# Patient Record
Sex: MALE | Race: BLACK OR AFRICAN AMERICAN | NOT HISPANIC OR LATINO | Employment: STUDENT | URBAN - METROPOLITAN AREA
[De-identification: names, ages, dates, MRNs, and addresses within clinical notes are randomized per-mention and may not be internally consistent; named-entity substitution may affect disease eponyms.]

---
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
## 2024-01-23 ENCOUNTER — OFFICE VISIT (OUTPATIENT)
Dept: PEDIATRICS | Facility: CLINIC | Age: 19
End: 2024-01-23
Payer: COMMERCIAL

## 2024-01-23 VITALS
HEART RATE: 84 BPM | SYSTOLIC BLOOD PRESSURE: 120 MMHG | OXYGEN SATURATION: 100 % | BODY MASS INDEX: 49.44 KG/M2 | DIASTOLIC BLOOD PRESSURE: 78 MMHG | RESPIRATION RATE: 18 BRPM | TEMPERATURE: 98 F | HEIGHT: 67 IN | WEIGHT: 315 LBS

## 2024-01-23 DIAGNOSIS — E88.819 INSULIN RESISTANCE: ICD-10-CM

## 2024-01-23 DIAGNOSIS — F41.9 ANXIETY: ICD-10-CM

## 2024-01-23 DIAGNOSIS — F90.2 ATTENTION DEFICIT HYPERACTIVITY DISORDER (ADHD), COMBINED TYPE: ICD-10-CM

## 2024-01-23 DIAGNOSIS — E66.01 MORBID OBESITY WITH BMI OF 50.0-59.9, ADULT: Primary | ICD-10-CM

## 2024-01-23 DIAGNOSIS — G47.19 EXCESSIVE DAYTIME SLEEPINESS: ICD-10-CM

## 2024-01-23 DIAGNOSIS — G47.13 KLEINE-LEVIN SYNDROME: ICD-10-CM

## 2024-01-23 DIAGNOSIS — R79.89 LOW VITAMIN D LEVEL: ICD-10-CM

## 2024-01-23 DIAGNOSIS — F90.0 ATTENTION DEFICIT HYPERACTIVITY DISORDER (ADHD), PREDOMINANTLY INATTENTIVE TYPE: ICD-10-CM

## 2024-01-23 DIAGNOSIS — L20.89 OTHER ATOPIC DERMATITIS: ICD-10-CM

## 2024-01-23 DIAGNOSIS — G43.009 MIGRAINE WITHOUT AURA AND WITHOUT STATUS MIGRAINOSUS, NOT INTRACTABLE: ICD-10-CM

## 2024-01-23 DIAGNOSIS — J30.9 ALLERGIC RHINITIS, UNSPECIFIED SEASONALITY, UNSPECIFIED TRIGGER: ICD-10-CM

## 2024-01-23 DIAGNOSIS — F33.0 MILD EPISODE OF RECURRENT MAJOR DEPRESSIVE DISORDER: ICD-10-CM

## 2024-01-23 DIAGNOSIS — I10 PRIMARY HYPERTENSION: ICD-10-CM

## 2024-01-23 DIAGNOSIS — G47.33 OBSTRUCTIVE SLEEP APNEA SYNDROME: ICD-10-CM

## 2024-01-23 DIAGNOSIS — H61.23 BILATERAL IMPACTED CERUMEN: ICD-10-CM

## 2024-01-23 DIAGNOSIS — G47.30 SLEEP-DISORDERED BREATHING: ICD-10-CM

## 2024-01-23 DIAGNOSIS — G47.419 NARCOLEPSY WITHOUT CATAPLEXY: ICD-10-CM

## 2024-01-23 DIAGNOSIS — J30.89 NON-SEASONAL ALLERGIC RHINITIS, UNSPECIFIED TRIGGER: ICD-10-CM

## 2024-01-23 DIAGNOSIS — Z62.820 PARENT-CHILD CONFLICT: ICD-10-CM

## 2024-01-23 DIAGNOSIS — F32.A DEPRESSION, UNSPECIFIED DEPRESSION TYPE: ICD-10-CM

## 2024-01-23 PROCEDURE — 99215 OFFICE O/P EST HI 40 MIN: CPT | Mod: PBBFAC,PN | Performed by: PEDIATRICS

## 2024-01-23 RX ORDER — SERDEXMETHYLPHENIDATE AND DEXMETHYLPHENIDATE 10.4; 52.3 MG/1; MG/1
1 CAPSULE ORAL DAILY
Qty: 30 CAPSULE | Refills: 0 | Status: SHIPPED | OUTPATIENT
Start: 2024-03-23

## 2024-01-23 RX ORDER — CLONIDINE HYDROCHLORIDE 0.1 MG/1
0.1 TABLET ORAL NIGHTLY
Qty: 30 TABLET | Refills: 5 | Status: SHIPPED | OUTPATIENT
Start: 2024-01-23 | End: 2024-05-20

## 2024-01-23 RX ORDER — FLUTICASONE PROPIONATE 50 MCG
2 SPRAY, SUSPENSION (ML) NASAL DAILY
Qty: 16 G | Refills: 2 | Status: SHIPPED | OUTPATIENT
Start: 2024-01-23 | End: 2024-05-20 | Stop reason: SDUPTHER

## 2024-01-23 RX ORDER — SERDEXMETHYLPHENIDATE AND DEXMETHYLPHENIDATE 7.8; 39.2 MG/1; MG/1
1 CAPSULE ORAL DAILY
Qty: 30 CAPSULE | Refills: 0 | Status: SHIPPED | OUTPATIENT
Start: 2024-01-23 | End: 2024-04-12 | Stop reason: SDUPTHER

## 2024-01-23 RX ORDER — SERDEXMETHYLPHENIDATE AND DEXMETHYLPHENIDATE 10.4; 52.3 MG/1; MG/1
1 CAPSULE ORAL DAILY
Qty: 30 CAPSULE | Refills: 0 | Status: SHIPPED | OUTPATIENT
Start: 2024-02-23 | End: 2024-04-12 | Stop reason: SDUPTHER

## 2024-01-23 RX ORDER — FLUOXETINE HYDROCHLORIDE 20 MG/1
40 CAPSULE ORAL DAILY
Qty: 60 CAPSULE | Refills: 5 | Status: SHIPPED | OUTPATIENT
Start: 2024-01-23 | End: 2024-05-20

## 2024-01-23 RX ORDER — CETIRIZINE HYDROCHLORIDE 10 MG/1
10 TABLET ORAL DAILY
Qty: 30 TABLET | Refills: 5 | Status: SHIPPED | OUTPATIENT
Start: 2024-01-23

## 2024-01-23 NOTE — PROGRESS NOTES
SUBJECTIVE:  Jeet Medina is a 18 y.o. male here accompanied by mother for Follow-up (Pt present with mother for for 3 month follow up visit for ADHD, depression, and obesity. Mom states pt is not taking his nightly medicines. No concerns today. UTD with vaccines. )      HPI      Spoke with Jeet is  for follow up of ADHD, anxiety, trichotillomania,depression, parent child conflict, morbid obesity, AD and sleep problems.    At the last visit adherence to fluoxetine was encouraged( now improved)  along with improved physical activity( no change).      Attempt to prescribe Ozempic was denied by insurance and liraglutide is being explored and will submit PA. .     Concerta was changed to Azstarys due to shortages of Concerta.  Jeet is uncertain about efficacy.      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  Is considering college at [employer] in [employer] but has not started any aid applications.     Family working on his  father's veterans benefits for college assistance. Headaches occurring 2-3 days per week, mostly mild to moderate, severe HA weekly.     Often relieved by sleep.  Claims taking topiramate daily.      Depression:  I am down in the dumps---not currently taking full dose of fluoxetine or event taking daily dose.   ADHD : Concerta not available and not taking, taking Azstarys  Obesity:  was not filled ( uncertain as to why).  Resistant to low impact exercise such as walking.   .     HTN currently managed per Dr. Lynn. Next appointment scheduled for 2022.     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     His/ her current grade level is: He is due to graduate from [employer] soon.   Plans to attend [employer] to study vocals, music and computer science. Is prepping for the ACT.  Due to have knee surgery     Are there accommodations in place such 504 plan, resource, tutoring etc?  "No    Mood: often angry or crying    Appetite: good appetite, weight stable    Physical activity: none     Sleep pattern:now with increased sleeping/ fatigue and some day time somnolence. Does snore.  Had PSG last fall without significant apneas or hypopneas.  Diagnosed with Kleine-Carter Syndrome ( very rare condition characterized by persistent episodic hypersomnia and cognitive mood changes.  Some patients have associated hyperphagia and hypersexuality. Neurology has recommended weaning stimulants and fluoxetine and repeating PSG.      Mood: seems more stable at present PHQ-9  score 10  Denies significant sadness or anxiety    Anxiety/ OCD: occasionally anxious at home  Scared :  12     Hallucinations: none    Tics: hair pulling is in remission      Review of Systems   Constitutional:  Negative for activity change, appetite change, fatigue and fever.        Mother reports disrupted sleep pattern and extensive time each 24 hours sleeping.    HENT:  Negative for congestion, hearing loss, rhinorrhea and sneezing.    Eyes:  Negative for discharge.   Respiratory:  Negative for cough, shortness of breath and wheezing.    Cardiovascular:  Negative for chest pain.   Gastrointestinal:  Negative for abdominal pain, constipation, diarrhea, nausea and vomiting.   Musculoskeletal:  Negative for joint swelling.        Has torn right  ACL   Neurological:  Negative for dizziness, seizures, weakness, numbness and headaches.   Psychiatric/Behavioral:  Positive for sleep disturbance. Negative for agitation, decreased concentration and hallucinations. Self-injury: improved with clonidine.The patient is nervous/anxious (improved). The patient is not hyperactive.       A comprehensive review of symptoms was completed and negative except as noted above.    OBJECTIVE:  Vital signs  Vitals:    01/23/24 1547   BP: 120/78   Pulse: 84   Resp: 18   Temp: 97.9 °F (36.6 °C)   SpO2: 100%   Weight: (!) 150 kg (330 lb 11 oz)   Height: 5' 7.32" " (1.71 m)         No exam today, telemed       Physical Exam  Vitals and nursing note reviewed.   Constitutional:       General: He is not in acute distress.     Appearance: He is obese.   HENT:      Head: Normocephalic.      Right Ear: There is impacted cerumen.      Left Ear: There is impacted cerumen.      Nose: Congestion: mild, turbinates pale and swollen.      Mouth/Throat:      Mouth: Mucous membranes are moist.      Pharynx: Oropharynx is clear. No posterior oropharyngeal erythema.   Eyes:      Conjunctiva/sclera: Conjunctivae normal.      Pupils: Pupils are equal, round, and reactive to light.   Cardiovascular:      Rate and Rhythm: Normal rate.      Pulses: Normal pulses.      Heart sounds: Normal heart sounds. No murmur heard.  Pulmonary:      Effort: No respiratory distress.      Breath sounds: Normal breath sounds.   Abdominal:      General: There is no distension.      Palpations: There is no mass.      Tenderness: There is no abdominal tenderness. There is no guarding or rebound.      Hernia: No hernia is present.   Musculoskeletal:         General: Normal range of motion.      Cervical back: Normal range of motion.      Right knee: Normal.      Left knee: No swelling, deformity, effusion, erythema, ecchymosis or crepitus. Normal range of motion. No tenderness.      Comments: Left knee with full ROM and strength, no effusion and no knee tenderness, able to do full deep  knee bend without difficulty   Lymphadenopathy:      Cervical: No cervical adenopathy.   Skin:     General: Skin is warm and dry.      Findings: No rash.   Neurological:      General: No focal deficit present.      Mental Status: He is alert and oriented to person, place, and time.      Cranial Nerves: No cranial nerve deficit.      Sensory: No sensory deficit.      Motor: No weakness.      Coordination: Coordination normal.      Comments: Not somnolent today.  Discs sharp          ASSESSMENT/PLAN:  Jeet was seen today for  follow-up.    Jeet is difficult assess.  He is as the very least ambivalent about his response to meds and or need for them.   Some of this appears to be deliberately holding back on honest answers.       1. Morbid obesity with BMI of 50.0-59.9, adult    2. Primary hypertension    3. Anxiety  - FLUoxetine 20 MG capsule; Take 2 capsules (40 mg total) by mouth once daily.  Dispense: 60 capsule; Refill: 5    4. Sleep-disordered breathing    5. Attention deficit hyperactivity disorder (ADHD), combined type  - cloNIDine (CATAPRES) 0.1 MG tablet; Take 1 tablet (0.1 mg total) by mouth every evening.  Dispense: 30 tablet; Refill: 5  - serdexmethylphen-dexmethylphen (AZSTARYS) 39.2 mg- 7.8 mg Cap; Take 1 capsule by mouth once daily.  Dispense: 30 capsule; Refill: 0  - serdexmethylphen-dexmethylphen (AZSTARYS) 52.3 mg- 10.4 mg Cap; Take 1 capsule by mouth once daily.  Dispense: 30 capsule; Refill: 0  - serdexmethylphen-dexmethylphen (AZSTARYS) 52.3 mg- 10.4 mg Cap; Take 1 capsule by mouth once daily.  Dispense: 30 capsule; Refill: 0    6. Other atopic dermatitis    7. Insulin resistance    8. Mild episode of recurrent major depressive disorder    9. Obstructive sleep apnea syndrome    10. 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 Low vitamin D level    13. Migraine without aura and without status migrainosus, not intractable    14. Narcolepsy without cataplexy    15. Non-seasonal allergic rhinitis, unspecified trigger    16. Excessive daytime sleepiness    17. Allergic rhinitis, unspecified seasonality, unspecified trigger  - cetirizine (ZYRTEC) 10 MG tablet; Take 1 tablet (10 mg total) by mouth once daily. As needed for allergy symptoms  Dispense: 30 tablet; Refill: 5  - fluticasone propionate (FLONASE) 50 mcg/actuation nasal spray; 2 sprays (100 mcg total) by Each Nostril route once daily.  Dispense: 16 g; Refill: 2    18. Attention deficit hyperactivity disorder (ADHD), predominantly inattentive type  -  cloNIDine (CATAPRES) 0.1 MG tablet; Take 1 tablet (0.1 mg total) by mouth every evening.  Dispense: 30 tablet; Refill: 5  - serdexmethylphen-dexmethylphen (AZSTARYS) 52.3 mg- 10.4 mg Cap; Take 1 capsule by mouth once daily.  Dispense: 30 capsule; Refill: 0  - serdexmethylphen-dexmethylphen (AZSTARYS) 52.3 mg- 10.4 mg Cap; Take 1 capsule by mouth once daily.  Dispense: 30 capsule; Refill: 0    19. Depression, unspecified depression type  - FLUoxetine 20 MG capsule; Take 2 capsules (40 mg total) by mouth once daily.  Dispense: 60 capsule; Refill: 5    20. Bilateral impacted cerumen  - Ear wax removal      Follow up in about 3 months (around 4/23/2024) for follow up ADHD.

## 2024-01-24 ENCOUNTER — TELEPHONE (OUTPATIENT)
Dept: PEDIATRICS | Facility: CLINIC | Age: 19
End: 2024-01-24
Payer: COMMERCIAL

## 2024-03-13 ENCOUNTER — TELEPHONE (OUTPATIENT)
Dept: PEDIATRICS | Facility: CLINIC | Age: 19
End: 2024-03-13
Payer: COMMERCIAL

## 2024-03-13 NOTE — TELEPHONE ENCOUNTER
I spoke with the mom.  She said that Jeet still having frequent migraine headaches.  Has been on Topamax.  Mom goes to this Neurologist for her migraines and she said that she would see Jeet.

## 2024-03-20 DIAGNOSIS — G43.009 MIGRAINE WITHOUT AURA AND WITHOUT STATUS MIGRAINOSUS, NOT INTRACTABLE: Primary | ICD-10-CM

## 2024-04-01 DIAGNOSIS — I10 PRIMARY HYPERTENSION: Primary | ICD-10-CM

## 2024-04-08 ENCOUNTER — TELEPHONE (OUTPATIENT)
Dept: PEDIATRICS | Facility: CLINIC | Age: 19
End: 2024-04-08
Payer: COMMERCIAL

## 2024-04-09 NOTE — TELEPHONE ENCOUNTER
Referral faxed to Robin Palacios MD [phone].  Voice mail left on mother's phone as she did not answer the call.

## 2024-04-12 ENCOUNTER — OFFICE VISIT (OUTPATIENT)
Dept: PEDIATRIC CARDIOLOGY | Facility: CLINIC | Age: 19
End: 2024-04-12
Payer: COMMERCIAL

## 2024-04-12 VITALS
SYSTOLIC BLOOD PRESSURE: 111 MMHG | WEIGHT: 315 LBS | HEART RATE: 63 BPM | BODY MASS INDEX: 49.44 KG/M2 | OXYGEN SATURATION: 99 % | RESPIRATION RATE: 20 BRPM | HEIGHT: 67 IN | DIASTOLIC BLOOD PRESSURE: 59 MMHG

## 2024-04-12 DIAGNOSIS — E66.01 MORBID OBESITY WITH BMI OF 50.0-59.9, ADULT: Primary | ICD-10-CM

## 2024-04-12 DIAGNOSIS — I10 PRIMARY HYPERTENSION: ICD-10-CM

## 2024-04-12 LAB
OHS QRS DURATION: 100 MS
OHS QTC CALCULATION: 362 MS

## 2024-04-12 PROCEDURE — 1159F MED LIST DOCD IN RCRD: CPT | Mod: CPTII,S$GLB,, | Performed by: PEDIATRICS

## 2024-04-12 PROCEDURE — 3078F DIAST BP <80 MM HG: CPT | Mod: CPTII,S$GLB,, | Performed by: PEDIATRICS

## 2024-04-12 PROCEDURE — 3074F SYST BP LT 130 MM HG: CPT | Mod: CPTII,S$GLB,, | Performed by: PEDIATRICS

## 2024-04-12 PROCEDURE — 99214 OFFICE O/P EST MOD 30 MIN: CPT | Mod: 25,S$GLB,, | Performed by: PEDIATRICS

## 2024-04-12 PROCEDURE — 4010F ACE/ARB THERAPY RXD/TAKEN: CPT | Mod: CPTII,S$GLB,, | Performed by: PEDIATRICS

## 2024-04-12 PROCEDURE — 93000 ELECTROCARDIOGRAM COMPLETE: CPT | Mod: S$GLB,,, | Performed by: PEDIATRICS

## 2024-04-12 PROCEDURE — 3008F BODY MASS INDEX DOCD: CPT | Mod: CPTII,S$GLB,, | Performed by: PEDIATRICS

## 2024-04-12 PROCEDURE — 1160F RVW MEDS BY RX/DR IN RCRD: CPT | Mod: CPTII,S$GLB,, | Performed by: PEDIATRICS

## 2024-04-12 RX ORDER — AMLODIPINE BESYLATE 10 MG/1
10 TABLET ORAL DAILY
Qty: 90 TABLET | Refills: 1 | Status: SHIPPED | OUTPATIENT
Start: 2024-04-12 | End: 2024-10-09

## 2024-04-12 RX ORDER — LISINOPRIL 20 MG/1
20 TABLET ORAL DAILY
Qty: 90 TABLET | Refills: 1 | Status: SHIPPED | OUTPATIENT
Start: 2024-04-12 | End: 2024-10-09

## 2024-04-12 RX ORDER — METHOCARBAMOL 750 MG/1
750 TABLET, FILM COATED ORAL EVERY 8 HOURS
COMMUNITY
Start: 2024-03-13 | End: 2024-05-20

## 2024-04-12 NOTE — PROGRESS NOTES
[facility]  [phone]  4/12/2024     Jeet Medina  2005  [id number]     Jeet is here today with his mother.  He comes in for follow up of the following concerns: Hypertension and Obesity - Metabolic Syndrome.    I reached out to  to clarify what diagnosis he gave and what is only part of the differential. I spoke with , who noted that his sleep study did not show apnea and he was diagnosed with idiopathic hypersomnia.     Presents today with Mom.   Patient presents today for follow up visit.   Mom states that they have not heard from bariatric clinic regarding referral from Dr. Christiansen (10/2022) and Dr. Lynn (3/2023).  Patient completed sleep study on 9/20/22. Dr. 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    Since seeing me last, he tore his ACL and had to have surgery. Not currently able to do much in the way of physical activity.   Denies chest pain, shortness of breath, palpitations, dizziness, activity intolerance.   No cough since starting the Lisinopril.   States has been taking medication as prescribed. (Will occasionally miss the evening dose or one day of meds during the week)  Reports good appetite and hydration.   UTD on immunizations.   There are no reports of chest pain, chest pain with exertion, cyanosis, exercise intolerance, dyspnea, fatigue, palpitations, syncope and tachypnea.     Review of Systems:   Neuro:   Normal development. No seizures. + headaches.  Psych: No known ADD or ADHD.  No known learning disabilities.  RESP:  No recurrent pneumonias or asthma.  GI:  No history of reflux. No change in bowel habits.  :  No history of urinary tract infection or renal structural abnormalities.  MS:  No muscle or joint swelling or apparent tenderness.  SKIN:  No history of  rashes.  Heme/lymphatic: No history of anemia, excessive bruising or bleeding.  Allergic/Immunologic: No history of environmental allergies or immune compromise.  ENT: No hearing loss, no recurring ear infections.  Eyes: No visual disturbance or need for glasses.     Past Medical History:   Diagnosis Date    ADHD     Anxiety     Depression     History of COVID-19 04/2021    Hypertension     Left ventricular hypertrophy 8/4/2022    LVH (left ventricular hypertrophy)     Migraines     Sleep disorder     Syncope and collapse     Trichotillomania     Verruca vulgaris      Past Surgical History:   Procedure Laterality Date    ANTERIOR CRUCIATE LIGAMENT REPAIR Right 02/15/2024    TONSILLECTOMY AND ADENOIDECTOMY  2009     FAMILY HISTORY:   Family History   Problem Relation Age of Onset    Diabetes Mother     Hypertension Mother     Migraines Mother     Sleep apnea Mother     Diabetes Father     Hypertension Father     Hyperlipidemia Father     No Known Problems Sister     No Known Problems Brother     Hypertension Maternal Grandmother     Diabetes Maternal Grandmother     Hyperthyroidism Maternal Grandmother     Heart murmur Maternal Grandmother     Heart failure Maternal Grandmother     Hypertension Maternal Grandfather     Diabetes Maternal Grandfather     Hyperlipidemia Maternal Grandfather     Heart attack Maternal Grandfather     Hypertension Paternal Grandmother     Diabetes Paternal Grandmother     Cancer Paternal Grandmother     Hypertension Paternal Grandfather     Diabetes Paternal Grandfather     No Known Problems Brother        Social History     Socioeconomic History    Marital status: Single   Tobacco Use    Smoking status: Never     Passive exposure: Never    Smokeless tobacco: Never   Substance and Sexual Activity    Alcohol use: Never    Drug use: Never    Sexual activity: Never   Social History Narrative    Lives with Mom. Currently paused from going to school and work due to his knee rehab.          MEDICATIONS:   Current Outpatient Medications on File Prior to Visit   Medication Sig Dispense Refill    albuterol (PROVENTIL/VENTOLIN HFA) 90 mcg/actuation inhaler SMARTSI Puff(s) By Mouth 4 Times Daily PRN      cetirizine (ZYRTEC) 10 MG tablet Take 1 tablet (10 mg total) by mouth once daily. As needed for allergy symptoms 30 tablet 5    cloNIDine (CATAPRES) 0.1 MG tablet Take 1 tablet (0.1 mg total) by mouth every evening. 30 tablet 5    FLUoxetine 20 MG capsule Take 2 capsules (40 mg total) by mouth once daily. 60 capsule 5    fluticasone propionate (FLONASE) 50 mcg/actuation nasal spray 2 sprays (100 mcg total) by Each Nostril route once daily. 16 g 2    methocarbamoL (ROBAXIN) 750 MG Tab Take 750 mg by mouth every 8 (eight) hours.      serdexmethylphen-dexmethylphen (AZSTARYS) 52.3 mg- 10.4 mg Cap Take 1 capsule by mouth once daily. 30 capsule 0    topiramate (TOPAMAX) 50 MG tablet Take 1 tablet (50 mg total) by mouth 2 (two) times daily. 60 tablet 11    [DISCONTINUED] amLODIPine (NORVASC) 10 MG tablet Take 1 tablet (10 mg total) by mouth once daily. 90 tablet 1    [DISCONTINUED] lisinopriL (PRINIVIL,ZESTRIL) 20 MG tablet Take 1 tablet (20 mg total) by mouth once daily. 90 tablet 1    azelastine (ASTELIN) 137 mcg (0.1 %) nasal spray 2 sprays (274 mcg total) by Nasal route 2 (two) times daily. (Patient not taking: Reported on 2024) 30 mL 5    EPINEPHrine (EPIPEN) 0.3 mg/0.3 mL AtIn use as directed (Patient not taking: Reported on 2024)      HIBICLENS 4 % external liquid Apply topically 2 (two) times daily.      mupirocin (BACTROBAN) 2 % ointment APPLY TO THE AFFECTED AREA(S) THREE TIMES DAILY FOR 7 DAYS      triamcinolone acetonide 0.1% (KENALOG) 0.1 % cream See Instructions, apply to rash TID as needed. 60 g 2    [DISCONTINUED] serdexmethylphen-dexmethylphen (AZSTARYS) 39.2 mg- 7.8 mg Cap Take 1 capsule by mouth once daily. (Patient not taking: Reported on 2024) 30 capsule 0     "[DISCONTINUED] serdexmethylphen-dexmethylphen (AZSTARYS) 52.3 mg- 10.4 mg Cap Take 1 capsule by mouth once daily. (Patient not taking: Reported on 4/12/2024) 30 capsule 0     No current facility-administered medications on file prior to visit.       Review of patient's allergies indicates:  No Known Allergies    Immunization status: up to date and documented.      PHYSICAL EXAM:  BP (!) 111/59 (BP Location: Left arm, Patient Position: Lying, BP Method: Large (Automatic))   Pulse 63   Resp 20   Ht 5' 7.32" (1.71 m)   Wt (!) 151.5 kg (334 lb)   SpO2 99%   BMI 51.81 kg/m²   Blood pressure %coleman are not available for patients who are 18 years or older.  Body mass index is 51.81 kg/m².    General appearance: The patient appears well-developed, well-nourished, in no distress, mobidly obese.  HEET: Normocephalic. No dysmorphic features. Pink, moist, mucous membranes.   Neck: No jugular venous distention. No carotid bruits. Acanthosis nigracans.  Chest: The chest is symmetrically developed.   Lungs: The lungs are clear to auscultation bilaterally, without rales rhonchi or wheezing. Symmetric air entry.  Cardiac: Quiet precordium with normal PMI in the fifth intercostal space, midclavicular line. Normal rate and rhythm. Normal intensity S1. Unable to appreciate S2 split. No clicks rubs gallops or murmurs.   Abdomen: Soft, nontender. Normal bowel sounds.  Extremities: Warm and well perfused. No clubbing, cyanosis, or edema.   Pulses: Normal (2+), symmetric, pulses in right and left upper and lower extremities.   Neuro: The patient interacts appropriately for age with the examiner. The patient  moves all extremities. Normal muscle tone.  Skin: No rashes. No excessive bruising.      TESTS:  I personally evaluated the following studies previously:    EKG :  Sinus rhythm with sinus arrhyhtmia  Early repolarization pattern    ECHOCARDIOGRAM 03/28/22:   1. Normal intracardiac anatomy as able to be visualized.   2. No LVH. "   3. Normal biventricular systolic function.   (Full report is in electronic medical record)     TC HDL TG LDL Chol/HDL VLDL A1c   12/28/21 141 38 69 89 4 14 5.7   10/28/22 150 42 58 96 4 12 5.6   01/20/23 154 41 111 91 4 22 5.7     Normal hepatic function panel in Jan 2023.  June 2023 Elevated Insulin level    ASSESSMENT :  Jeet is a 18 y.o. male with controlled Hypertension on dual medication therapy.    Metabolic Syndrome: additionally, you have three factors out of five that contribute to metabolic syndrome, which long term is a poor prognosis for long life.  Extra weight with a large waist. Being overweight or obese means that you weigh too much for what is healthy for your height. his BMI is 51.  High blood pressure (hypertension) - elevated in the Stage 2 Hypertension range initially, although on medication is controlled in the normal range. We discussed possibly decreasing doses given his pressure today, but knowing he isn't moving around a lot now, I do not want to make changes and will readdress at his next visit.   Low HDL cholesterol, although this is improving. HDL is known as good cholesterol. It protects your blood vessels from harmful LDL cholesterol. Low HDL cholesterol means less than 40 mg/dL for males or 50 mg/dL for females. If your level is low, your blood vessels are not as protected.    RECOMMENDATIONS:   Amlodipine 10 mg po daily.   Lisinopril 20 mg by mouth once daily.   Follow up annual labs to include BMP, CBC, A1c and fasting Lipid Panel. Reach out to [facility] for labs done in the last 6 months.   We discussed continued exercise working up to 60 minutes 5 days per week starting slow and low and working the way up after he is cleared from PT.  I reached out to the Bariatric Clinic and found out that they did call the family in March without answer. Additionally, they do not file with insurance for medical.   All questions were answered to their satisfaction.    Activity:No activity  restrictions are indicated at this time. Activities may include endurance training, interscholastic athletic, competition and contact sports.    Endocarditis prophylaxis is not recommended in this circumstance.     FOLLOW UP:  Follow-Up clinic visit in 6 months with the following tests:  EKG.    35 minutes were spent in this encounter, at least 50% of which was face to face consultation with Jeet and his family about the following: see above.       Lashay Lynn MD  Pediatric Cardiologist

## 2024-04-12 NOTE — LETTER
April 12, 2024        Zach Christiansen MD  [address]  [address]  [place] [address]             [facility]  [address] [address]  Phone: [phone]  Fax: [phone]   Patient: Jeet Medina   MR Number: [id number]   YOB: 2005   Date of Visit: 4/12/2024       Dear Dr. Christiansen:    Thank you for referring Jeet Medina to me for evaluation. Attached you will find relevant portions of my assessment and plan of care.    If you have questions, please do not hesitate to call me. I look forward to following Jeet Medina along with you.    Sincerely,      Lashay Lynn MD            CC  No Recipients    Enclosure

## 2024-05-20 ENCOUNTER — OFFICE VISIT (OUTPATIENT)
Dept: PEDIATRICS | Facility: CLINIC | Age: 19
End: 2024-05-20
Payer: COMMERCIAL

## 2024-05-20 VITALS
BODY MASS INDEX: 47.74 KG/M2 | SYSTOLIC BLOOD PRESSURE: 126 MMHG | TEMPERATURE: 98 F | HEIGHT: 68 IN | DIASTOLIC BLOOD PRESSURE: 78 MMHG | RESPIRATION RATE: 18 BRPM | WEIGHT: 315 LBS | HEART RATE: 98 BPM

## 2024-05-20 DIAGNOSIS — G47.419 NARCOLEPSY WITHOUT CATAPLEXY: ICD-10-CM

## 2024-05-20 DIAGNOSIS — E88.819 INSULIN RESISTANCE: ICD-10-CM

## 2024-05-20 DIAGNOSIS — G47.13 KLEINE-LEVIN SYNDROME: ICD-10-CM

## 2024-05-20 DIAGNOSIS — L20.89 OTHER ATOPIC DERMATITIS: ICD-10-CM

## 2024-05-20 DIAGNOSIS — J30.89 NON-SEASONAL ALLERGIC RHINITIS, UNSPECIFIED TRIGGER: ICD-10-CM

## 2024-05-20 DIAGNOSIS — J30.9 ALLERGIC RHINITIS, UNSPECIFIED SEASONALITY, UNSPECIFIED TRIGGER: ICD-10-CM

## 2024-05-20 DIAGNOSIS — T50.8X5S ADVERSE EFFECT OF CONTRAST MEDIA, SEQUELA: ICD-10-CM

## 2024-05-20 DIAGNOSIS — G43.009 MIGRAINE WITHOUT AURA AND WITHOUT STATUS MIGRAINOSUS, NOT INTRACTABLE: ICD-10-CM

## 2024-05-20 DIAGNOSIS — G47.19 EXCESSIVE DAYTIME SLEEPINESS: ICD-10-CM

## 2024-05-20 DIAGNOSIS — F41.9 ANXIETY: ICD-10-CM

## 2024-05-20 DIAGNOSIS — E66.01 MORBID OBESITY WITH BMI OF 50.0-59.9, ADULT: Primary | ICD-10-CM

## 2024-05-20 DIAGNOSIS — F32.0 CURRENT MILD EPISODE OF MAJOR DEPRESSIVE DISORDER WITHOUT PRIOR EPISODE: ICD-10-CM

## 2024-05-20 DIAGNOSIS — G47.30 SLEEP-DISORDERED BREATHING: ICD-10-CM

## 2024-05-20 DIAGNOSIS — I10 HYPERTENSION, UNSPECIFIED TYPE: ICD-10-CM

## 2024-05-20 DIAGNOSIS — G47.33 OBSTRUCTIVE SLEEP APNEA SYNDROME: ICD-10-CM

## 2024-05-20 DIAGNOSIS — F33.0 MILD EPISODE OF RECURRENT MAJOR DEPRESSIVE DISORDER: ICD-10-CM

## 2024-05-20 DIAGNOSIS — F90.2 ATTENTION DEFICIT HYPERACTIVITY DISORDER (ADHD), COMBINED TYPE: ICD-10-CM

## 2024-05-20 DIAGNOSIS — R79.89 LOW VITAMIN D LEVEL: ICD-10-CM

## 2024-05-20 PROBLEM — T50.8X5A REACTION TO CONTRAST MEDIA: Status: ACTIVE | Noted: 2024-05-20

## 2024-05-20 PROBLEM — R55 SYNCOPE: Status: RESOLVED | Noted: 2022-03-28 | Resolved: 2024-05-20

## 2024-05-20 PROBLEM — L72.3 INFECTED SEBACEOUS CYST: Status: RESOLVED | Noted: 2022-08-04 | Resolved: 2024-05-20

## 2024-05-20 PROBLEM — L08.9 INFECTED SEBACEOUS CYST: Status: RESOLVED | Noted: 2022-08-04 | Resolved: 2024-05-20

## 2024-05-20 LAB
ALBUMIN SERPL-MCNC: 4 G/DL (ref 3.5–5)
ALBUMIN/GLOB SERPL: 1 RATIO (ref 1.1–2)
ALP SERPL-CCNC: 70 UNIT/L
ALT SERPL-CCNC: 14 UNIT/L (ref 0–55)
ANION GAP SERPL CALC-SCNC: 9 MEQ/L
AST SERPL-CCNC: 14 UNIT/L (ref 5–34)
BILIRUB SERPL-MCNC: 0.6 MG/DL
BUN SERPL-MCNC: 15.9 MG/DL (ref 8.9–20.6)
CALCIUM SERPL-MCNC: 9.8 MG/DL (ref 8.4–10.2)
CHLORIDE SERPL-SCNC: 107 MMOL/L (ref 98–107)
CO2 SERPL-SCNC: 19 MMOL/L (ref 22–29)
CREAT SERPL-MCNC: 1.08 MG/DL (ref 0.73–1.18)
CREAT/UREA NIT SERPL: 15
GFR SERPLBLD CREATININE-BSD FMLA CKD-EPI: >60 ML/MIN/1.73/M2
GLOBULIN SER-MCNC: 3.9 GM/DL (ref 2.4–3.5)
GLUCOSE SERPL-MCNC: 108 MG/DL (ref 74–100)
POTASSIUM SERPL-SCNC: 4 MMOL/L (ref 3.5–5.1)
PROT SERPL-MCNC: 7.9 GM/DL (ref 6.4–8.3)
SODIUM SERPL-SCNC: 135 MMOL/L (ref 136–145)

## 2024-05-20 PROCEDURE — 99215 OFFICE O/P EST HI 40 MIN: CPT | Mod: PBBFAC,PN | Performed by: PEDIATRICS

## 2024-05-20 PROCEDURE — 36415 COLL VENOUS BLD VENIPUNCTURE: CPT | Performed by: PEDIATRICS

## 2024-05-20 PROCEDURE — 80053 COMPREHEN METABOLIC PANEL: CPT | Performed by: PEDIATRICS

## 2024-05-20 RX ORDER — SEMAGLUTIDE 0.25 MG/.5ML
0.25 INJECTION, SOLUTION SUBCUTANEOUS
Qty: 1 EACH | Refills: 3 | Status: SHIPPED | OUTPATIENT
Start: 2024-05-20

## 2024-05-20 RX ORDER — TOPIRAMATE 50 MG/1
50 TABLET, FILM COATED ORAL 2 TIMES DAILY
Qty: 60 TABLET | Refills: 11 | Status: SHIPPED | OUTPATIENT
Start: 2024-05-20 | End: 2025-05-20

## 2024-05-20 RX ORDER — FLUTICASONE PROPIONATE 50 MCG
2 SPRAY, SUSPENSION (ML) NASAL DAILY
Qty: 16 G | Refills: 2 | Status: SHIPPED | OUTPATIENT
Start: 2024-05-20

## 2024-05-20 RX ORDER — FLUOXETINE HYDROCHLORIDE 40 MG/1
40 CAPSULE ORAL
COMMUNITY
Start: 2024-04-30

## 2024-05-20 NOTE — PROGRESS NOTES
SUBJECTIVE:  Jeet Medina is a 18 y.o. male here accompanied by mother for ADHD (Here for routine visit & med refills. Mom states anxiety and depression meds are not working well.  Had R knee surgery in Feb.  C/O R knee pain.  Rates pain a 6-710.  Also been having stomach issues past 3 days since having MRI done. Not taking Fluoxetine as prescribed.)      HPI     Jeet is here with his mother  for follow up of ADHD, anxiety, trichotillomania,depression, parent child conflict, morbid obesity, AD and sleep problems.    Jeet had recent knee surgery and is in physical therapy but having apparently unexplained knee pain which is in evaluation.  This has interfered with his physical activity and he will not start college until he can reliably walk between classes.     Migraine:  He had a recent MRI which showed some evidence of sinus disease which may explain some head aches but does not explain his migraine symptoms.  He is not taking his topiramate regularly.Headaches occurring 2-3 days per week, mostly mild to moderate, severe HA weekly.     Often relieved by sleep.     Since MRI and contrast he has had ongoing nausea and vomiting.    At the last visit adherence to fluoxetine was encouraged and dose increased but is now only taking 20 mg again. He is complaining of increased anxiety and depression ( crying a lot)Asked Jeet and mom to discard 20 mg and take the 40's.  They are seeking counseling for Jeet also.       Concerta was changed to Azstarys due to shortages of Concerta.  Jeet is uncertain about efficacy.  He is not currently taking as he does not need it for work or school.     Graduated HS.  Is considering college at [employer] in [place].  Financial aid applications are on hold until mobility is better. .     Family working on his  father's veterans benefits for college assistance.   Depression:  I am down in the dumps---not currently taking full dose of fluoxetine or event taking  daily dose.   ADHD : Concerta not available and not taking, taking Azstarys  Obesity:  was not filled ( uncertain as to why).  Resistant to low impact exercise such as walking.   .     HTN currently managed per Dr. Lynn. Next appointment scheduled for 6/28/2022.     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     His/ her current grade level is: He is due to graduate from [employer] soon.   Plans to attend [employer] to study vocals, music and computer science. Is prepping for the ACT.  Due to have knee surgery 2/6    Are there accommodations in place such 504 plan, resource, tutoring etc? No    Mood: often angry or crying    Appetite: good appetite, weight increased     Physical activity: none     Sleep pattern: denies current problems with sleep onset or maintenance and will suggest discontinuation of clonidine. Had PSG last fall without significant apneas or hypopneas.  Diagnosed with Kleine-Carter Syndrome ( very rare condition characterized by persistent episodic hypersomnia and cognitive mood changes.  Some patients have associated hyperphagia and hypersexuality. Neurology has recommended weaning stimulants and fluoxetine and repeating PSG.      Mood: highly variable     Anxiety/ OCD: anxiety increased     Hallucinations: none    Tics: hair pulling is in remission      Review of Systems   Constitutional:  Negative for activity change, appetite change, fatigue, fever and unexpected weight change.        Mother reports disrupted sleep pattern and extensive time each 24 hours sleeping.    HENT:  Negative for congestion, hearing loss, rhinorrhea, sneezing and trouble swallowing.    Eyes:  Negative for discharge and visual disturbance.   Respiratory:  Negative for cough, chest tightness, shortness of breath and wheezing.    Cardiovascular:  Negative for chest pain and palpitations.   Gastrointestinal:  Negative for abdominal pain, blood in  "stool, constipation, diarrhea, nausea and vomiting.   Endocrine: Negative for polydipsia and polyuria.   Genitourinary:  Negative for difficulty urinating, hematuria and urgency.   Musculoskeletal:  Positive for arthralgias. Negative for joint swelling and neck pain.        Has torn right  ACL   Skin: Negative.    Neurological:  Negative for dizziness, seizures, weakness, numbness and headaches.   Psychiatric/Behavioral:  Positive for dysphoric mood and sleep disturbance. Negative for agitation, confusion, decreased concentration, hallucinations, self-injury (improved with clonidine) and suicidal ideas. The patient is nervous/anxious (improved). The patient is not hyperactive.       A comprehensive review of symptoms was completed and negative except as noted above.    OBJECTIVE:  Vital signs  Vitals:    05/20/24 1304   BP: 126/78   BP Location: Left forearm   Patient Position: Sitting   BP Method: X-Large (Automatic)   Pulse: 98   Resp: 18   Temp: 97.7 °F (36.5 °C)   TempSrc: Temporal   Weight: (!) 154 kg (339 lb 8.1 oz)   Height: 5' 7.52" (1.715 m)            Physical Exam  Vitals and nursing note reviewed.   Constitutional:       General: He is not in acute distress.     Appearance: He is obese.   HENT:      Head: Normocephalic.      Right Ear: Tympanic membrane normal. There is no impacted cerumen.      Left Ear: Tympanic membrane normal. There is no impacted cerumen.      Nose: Congestion: mild, turbinates pale and swollen.      Mouth/Throat:      Mouth: Mucous membranes are moist.      Pharynx: Oropharynx is clear. No posterior oropharyngeal erythema.   Eyes:      Extraocular Movements: Extraocular movements intact.      Conjunctiva/sclera: Conjunctivae normal.      Pupils: Pupils are equal, round, and reactive to light.   Cardiovascular:      Rate and Rhythm: Normal rate.      Pulses: Normal pulses.      Heart sounds: Normal heart sounds. No murmur heard.  Pulmonary:      Effort: No respiratory distress.      " Breath sounds: Normal breath sounds.   Abdominal:      General: There is no distension.      Palpations: There is no mass.      Tenderness: There is no abdominal tenderness. There is no guarding or rebound.      Hernia: No hernia is present.   Musculoskeletal:         General: Normal range of motion.      Cervical back: Normal range of motion.      Right knee: Normal.      Left knee: No swelling, deformity, effusion, erythema, ecchymosis or crepitus. Normal range of motion. No tenderness.      Comments: Well healed surgical wound left knee   Lymphadenopathy:      Cervical: No cervical adenopathy.   Skin:     General: Skin is warm and dry.      Findings: No rash.   Neurological:      General: No focal deficit present.      Mental Status: He is alert and oriented to person, place, and time.      Cranial Nerves: No cranial nerve deficit.      Sensory: No sensory deficit.      Motor: No weakness.      Coordination: Coordination normal.      Comments: Not somnolent today.  Discs sharp          ASSESSMENT/PLAN:  Jeet was seen today for follow-up.    Jeet is transitioning to adult care with Dr. Esteban Pacheco.  Will again attempt semaglutide due to morbid obesity, effect on mobility, hypertension and insulin resistance.  Will also refer for bariatric surgery.   CMP done today due to persistence of nausea pos IV contrast.     1. Morbid obesity with BMI of 50.0-59.9, adult  - Ambulatory referral/consult to General Surgery; Future  - semaglutide, weight loss, (WEGOVY) 0.25 mg/0.5 mL PnIj; Inject 0.25 mg into the skin every 7 days.  Dispense: 1 each; Refill: 3    2. Hypertension, unspecified type  - Ambulatory referral/consult to General Surgery; Future  - semaglutide, weight loss, (WEGOVY) 0.25 mg/0.5 mL PnIj; Inject 0.25 mg into the skin every 7 days.  Dispense: 1 each; Refill: 3  - Comprehensive Metabolic Panel    3. Anxiety  Increase fluoxetine to 40 mg, counseling suggested   4. Sleep-disordered breathing    5.  Attention deficit hyperactivity disorder (ADHD), combined type  Hold stimulant meds for now   6. Other atopic dermatitis    7. Insulin resistance  - Ambulatory referral/consult to General Surgery; Future  - semaglutide, weight loss, (WEGOVY) 0.25 mg/0.5 mL PnIj; Inject 0.25 mg into the skin every 7 days.  Dispense: 1 each; Refill: 3    8. Mild episode of recurrent major depressive disorder    9. Obstructive sleep apnea syndrome    10. 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 Low vitamin D level    13. Migraine without aura and without status migrainosus, not intractable  - topiramate (TOPAMAX) 50 MG tablet; Take 1 tablet (50 mg total) by mouth 2 (two) times daily.  Dispense: 60 tablet; Refill: 11    14. Narcolepsy without cataplexy    15. Non-seasonal allergic rhinitis, unspecified trigger    16. Excessive daytime sleepiness    17. Allergic rhinitis, unspecified seasonality, unspecified trigger  - fluticasone propionate (FLONASE) 50 mcg/actuation nasal spray; 2 sprays (100 mcg total) by Each Nostril route once daily.  Dispense: 16 g; Refill: 2    18. Adverse effect of contrast media, sequela  - Comprehensive Metabolic Panel      Follow up if symptoms worsen or fail to improve.  To follow up with zahraa Alcazarl

## 2024-05-21 ENCOUNTER — TELEPHONE (OUTPATIENT)
Dept: PEDIATRICS | Facility: CLINIC | Age: 19
End: 2024-05-21
Payer: COMMERCIAL

## 2024-06-04 ENCOUNTER — TELEPHONE (OUTPATIENT)
Dept: SURGERY | Facility: CLINIC | Age: 19
End: 2024-06-04
Payer: COMMERCIAL

## 2024-06-04 ENCOUNTER — TELEPHONE (OUTPATIENT)
Dept: PEDIATRICS | Facility: CLINIC | Age: 19
End: 2024-06-04
Payer: COMMERCIAL

## 2024-06-04 NOTE — TELEPHONE ENCOUNTER
Pts mother left message on VM returning your call/ attempts to scheduled consult  Phone number is [phone]

## 2024-10-25 DIAGNOSIS — F32.A DEPRESSION, UNSPECIFIED DEPRESSION TYPE: ICD-10-CM

## 2024-10-25 DIAGNOSIS — F41.9 ANXIETY: ICD-10-CM

## 2024-10-28 RX ORDER — FLUOXETINE HYDROCHLORIDE 20 MG/1
40 CAPSULE ORAL
Qty: 60 CAPSULE | Refills: 2 | Status: SHIPPED | OUTPATIENT
Start: 2024-10-28

## 2024-12-27 DIAGNOSIS — J30.9 ALLERGIC RHINITIS, UNSPECIFIED SEASONALITY, UNSPECIFIED TRIGGER: ICD-10-CM

## 2024-12-30 RX ORDER — CETIRIZINE HYDROCHLORIDE 10 MG/1
10 TABLET ORAL DAILY PRN
Qty: 30 TABLET | Refills: 0 | Status: SHIPPED | OUTPATIENT
Start: 2024-12-30

## 2025-02-03 ENCOUNTER — HOSPITAL ENCOUNTER (EMERGENCY)
Facility: HOSPITAL | Age: 20
Discharge: HOME | End: 2025-02-03
Attending: STUDENT IN AN ORGANIZED HEALTH CARE EDUCATION/TRAINING PROGRAM
Payer: COMMERCIAL

## 2025-02-03 VITALS
BODY MASS INDEX: 47.74 KG/M2 | HEART RATE: 85 BPM | WEIGHT: 315 LBS | HEIGHT: 68 IN | DIASTOLIC BLOOD PRESSURE: 93 MMHG | RESPIRATION RATE: 18 BRPM | TEMPERATURE: 100 F | SYSTOLIC BLOOD PRESSURE: 121 MMHG | OXYGEN SATURATION: 97 %

## 2025-02-03 DIAGNOSIS — K92.1 BLOOD IN STOOL: Primary | ICD-10-CM

## 2025-02-03 LAB
ALBUMIN SERPL-MCNC: 3.8 G/DL (ref 3.5–5)
ALBUMIN/GLOB SERPL: 1 RATIO (ref 1.1–2)
ALP SERPL-CCNC: 76 UNIT/L (ref 40–150)
ALT SERPL-CCNC: 10 UNIT/L (ref 0–55)
ANION GAP SERPL CALC-SCNC: 8 MEQ/L
APTT PPP: 24.8 SECONDS (ref 23.2–33.7)
AST SERPL-CCNC: 11 UNIT/L (ref 5–34)
BACTERIA #/AREA URNS AUTO: ABNORMAL /HPF
BASOPHILS # BLD AUTO: 0.02 X10(3)/MCL
BASOPHILS NFR BLD AUTO: 0.2 %
BILIRUB SERPL-MCNC: 0.5 MG/DL
BILIRUB UR QL STRIP.AUTO: NEGATIVE
BUN SERPL-MCNC: 14.4 MG/DL (ref 8.9–20.6)
CALCIUM SERPL-MCNC: 9.1 MG/DL (ref 8.4–10.2)
CHLORIDE SERPL-SCNC: 107 MMOL/L (ref 98–107)
CLARITY UR: CLEAR
CO2 SERPL-SCNC: 23 MMOL/L (ref 22–29)
COLOR UR AUTO: ABNORMAL
CREAT SERPL-MCNC: 0.89 MG/DL (ref 0.72–1.25)
CREAT/UREA NIT SERPL: 16
EOSINOPHIL # BLD AUTO: 0.05 X10(3)/MCL (ref 0–0.9)
EOSINOPHIL NFR BLD AUTO: 0.6 %
ERYTHROCYTE [DISTWIDTH] IN BLOOD BY AUTOMATED COUNT: 14.4 % (ref 11.5–17)
GFR SERPLBLD CREATININE-BSD FMLA CKD-EPI: >60 ML/MIN/1.73/M2
GLOBULIN SER-MCNC: 4 GM/DL (ref 2.4–3.5)
GLUCOSE SERPL-MCNC: 111 MG/DL (ref 74–100)
GLUCOSE UR QL STRIP: NORMAL
HCT VFR BLD AUTO: 43.8 % (ref 42–52)
HGB BLD-MCNC: 14 G/DL (ref 14–18)
HGB UR QL STRIP: NEGATIVE
IMM GRANULOCYTES # BLD AUTO: 0.04 X10(3)/MCL (ref 0–0.04)
IMM GRANULOCYTES NFR BLD AUTO: 0.5 %
INR PPP: 1
KETONES UR QL STRIP: NEGATIVE
LEUKOCYTE ESTERASE UR QL STRIP: NEGATIVE
LYMPHOCYTES # BLD AUTO: 2.82 X10(3)/MCL (ref 0.6–4.6)
LYMPHOCYTES NFR BLD AUTO: 33.1 %
MCH RBC QN AUTO: 25.3 PG (ref 27–31)
MCHC RBC AUTO-ENTMCNC: 32 G/DL (ref 33–36)
MCV RBC AUTO: 79.2 FL (ref 80–94)
MONOCYTES # BLD AUTO: 0.76 X10(3)/MCL (ref 0.1–1.3)
MONOCYTES NFR BLD AUTO: 8.9 %
MUCOUS THREADS URNS QL MICRO: ABNORMAL /LPF
NEUTROPHILS # BLD AUTO: 4.84 X10(3)/MCL (ref 2.1–9.2)
NEUTROPHILS NFR BLD AUTO: 56.7 %
NITRITE UR QL STRIP: NEGATIVE
NRBC BLD AUTO-RTO: 0 %
PH UR STRIP: 7 [PH]
PLATELET # BLD AUTO: 334 X10(3)/MCL (ref 130–400)
PMV BLD AUTO: 8.7 FL (ref 7.4–10.4)
POTASSIUM SERPL-SCNC: 4.1 MMOL/L (ref 3.5–5.1)
PROT SERPL-MCNC: 7.8 GM/DL (ref 6.4–8.3)
PROT UR QL STRIP: NEGATIVE
PROTHROMBIN TIME: 13.3 SECONDS (ref 12.5–14.5)
RBC # BLD AUTO: 5.53 X10(6)/MCL (ref 4.7–6.1)
RBC #/AREA URNS AUTO: ABNORMAL /HPF
SODIUM SERPL-SCNC: 138 MMOL/L (ref 136–145)
SP GR UR STRIP.AUTO: 1.03 (ref 1–1.03)
SQUAMOUS #/AREA URNS LPF: ABNORMAL /HPF
UROBILINOGEN UR STRIP-ACNC: 2
WBC # BLD AUTO: 8.53 X10(3)/MCL (ref 4.5–11.5)
WBC #/AREA URNS AUTO: ABNORMAL /HPF

## 2025-02-03 PROCEDURE — 99283 EMERGENCY DEPT VISIT LOW MDM: CPT

## 2025-02-03 PROCEDURE — 85610 PROTHROMBIN TIME: CPT | Performed by: NURSE PRACTITIONER

## 2025-02-03 PROCEDURE — 80053 COMPREHEN METABOLIC PANEL: CPT | Performed by: NURSE PRACTITIONER

## 2025-02-03 PROCEDURE — 85025 COMPLETE CBC W/AUTO DIFF WBC: CPT | Performed by: NURSE PRACTITIONER

## 2025-02-03 PROCEDURE — 81001 URINALYSIS AUTO W/SCOPE: CPT | Performed by: NURSE PRACTITIONER

## 2025-02-03 PROCEDURE — 85730 THROMBOPLASTIN TIME PARTIAL: CPT | Performed by: NURSE PRACTITIONER

## 2025-02-03 NOTE — FIRST PROVIDER EVALUATION
Medical screening examination initiated.  I have conducted a focused provider triage encounter, findings are as follows:    Brief history of present illness:  Patient states bloody stools x 4 days and abdominal pain.     There were no vitals filed for this visit.    Pertinent physical exam:  Awake, alert, ambulatory       Brief workup plan:  Labs    Preliminary workup initiated; this workup will be continued and followed by the physician or advanced practice provider that is assigned to the patient when roomed.

## 2025-02-04 NOTE — DISCHARGE INSTRUCTIONS
Lactulose twice a day to help soften stools so you aren't straining. Warm sitz baths.     If symptoms do not improve in next 2 weeks, follow up with primary care

## 2025-02-04 NOTE — ED PROVIDER NOTES
Encounter Date: 2/3/2025       History     Chief Complaint   Patient presents with    Rectal Bleeding     Patient reports rectal bleeding x 4 days. Denies abdominal pain. PMH HTN, ADHD. Denies history of rectal bleeding.      See MDM    The history is provided by the patient. No  was used.     Review of patient's allergies indicates:   Allergen Reactions    Insect venom Dermatitis, Itching and Other (See Comments)    Cat dander Itching and Rash    Cockroach Hives, Itching and Rash    Perfume Dermatitis, Hives, Itching, Nausea Only and Rash     Past Medical History:   Diagnosis Date    ADHD     Anxiety     Depression     History of COVID-19 04/2021    Hypertension     Left ventricular hypertrophy 8/4/2022    LVH (left ventricular hypertrophy)     Migraines     Sleep disorder     Syncope and collapse     Trichotillomania     Verruca vulgaris      Past Surgical History:   Procedure Laterality Date    ANTERIOR CRUCIATE LIGAMENT REPAIR Right 02/15/2024    TONSILLECTOMY AND ADENOIDECTOMY  2009     Family History   Problem Relation Name Age of Onset    Diabetes Mother      Hypertension Mother      Migraines Mother      Sleep apnea Mother      Diabetes Father      Hypertension Father      Hyperlipidemia Father      No Known Problems Sister      No Known Problems Brother      Hypertension Maternal Grandmother      Diabetes Maternal Grandmother      Hyperthyroidism Maternal Grandmother      Heart murmur Maternal Grandmother      Heart failure Maternal Grandmother      Hypertension Maternal Grandfather      Diabetes Maternal Grandfather      Hyperlipidemia Maternal Grandfather      Heart attack Maternal Grandfather      Hypertension Paternal Grandmother      Diabetes Paternal Grandmother      Cancer Paternal Grandmother      Hypertension Paternal Grandfather      Diabetes Paternal Grandfather      No Known Problems Brother       Social History     Tobacco Use    Smoking status: Never     Passive exposure:  Never    Smokeless tobacco: Never   Substance Use Topics    Alcohol use: Never    Drug use: Never     Review of Systems   Gastrointestinal:  Positive for blood in stool and constipation. Negative for abdominal pain.   All other systems reviewed and are negative.      Physical Exam     Initial Vitals [02/03/25 1622]   BP Pulse Resp Temp SpO2   126/83 80 18 99.7 °F (37.6 °C) 98 %      MAP       --         Physical Exam    Nursing note and vitals reviewed.  Constitutional: He appears well-developed and well-nourished.   Cardiovascular:  Normal rate, regular rhythm and normal heart sounds.           Pulmonary/Chest: Breath sounds normal. No respiratory distress.   Abdominal: Abdomen is soft. He exhibits no distension. There is no abdominal tenderness.   obese   Genitourinary:    Rectum normal.   Rectum:      No external hemorrhoid or abnormal anal tone.      Genitourinary Comments: No blood on glove       Neurological: He is alert and oriented to person, place, and time.   Skin: Skin is warm and dry.   Psychiatric: He has a normal mood and affect.         ED Course   Procedures  Labs Reviewed   COMPREHENSIVE METABOLIC PANEL - Abnormal       Result Value    Sodium 138      Potassium 4.1      Chloride 107      CO2 23      Glucose 111 (*)     Blood Urea Nitrogen 14.4      Creatinine 0.89      Calcium 9.1      Protein Total 7.8      Albumin 3.8      Globulin 4.0 (*)     Albumin/Globulin Ratio 1.0 (*)     Bilirubin Total 0.5      ALP 76      ALT 10      AST 11      eGFR >60      Anion Gap 8.0      BUN/Creatinine Ratio 16     URINALYSIS, REFLEX TO URINE CULTURE - Abnormal    Color, UA Light-Yellow      Appearance, UA Clear      Specific Gravity, UA 1.026      pH, UA 7.0      Protein, UA Negative      Glucose, UA Normal      Ketones, UA Negative      Blood, UA Negative      Bilirubin, UA Negative      Urobilinogen, UA 2.0 (*)     Nitrites, UA Negative      Leukocyte Esterase, UA Negative      RBC, UA 0-5      WBC, UA 0-5       Bacteria, UA None Seen      Squamous Epithelial Cells, UA None Seen      Mucous, UA Trace (*)    CBC WITH DIFFERENTIAL - Abnormal    WBC 8.53      RBC 5.53      Hgb 14.0      Hct 43.8      MCV 79.2 (*)     MCH 25.3 (*)     MCHC 32.0 (*)     RDW 14.4      Platelet 334      MPV 8.7      Neut % 56.7      Lymph % 33.1      Mono % 8.9      Eos % 0.6      Basophil % 0.2      Imm Grans % 0.5      Neut # 4.84      Lymph # 2.82      Mono # 0.76      Eos # 0.05      Baso # 0.02      Imm Gran # 0.04      NRBC% 0.0     APTT - Normal    PTT 24.8     PROTIME-INR - Normal    PT 13.3      INR 1.0     CBC W/ AUTO DIFFERENTIAL    Narrative:     The following orders were created for panel order CBC Auto Differential.  Procedure                               Abnormality         Status                     ---------                               -----------         ------                     CBC with Differential[[id number]]       Abnormal            Final result                 Please view results for these tests on the individual orders.          Imaging Results    None          Medications - No data to display  Medical Decision Making  19-year-old male presents with blood in stool for the past 4 days.  He has had a bowel movement each day.  Initially 4 days ago it was a hard bowel movement in his body did strained some and he felt like there was a good amount of blood.  It was also uncomfortable after the bowel movement.  He states the last 2 days have been mild but still having some blood and then today he had another episode with a fair amount of blood in it and somewhat firm stool.  He denies any abdominal pain denies being on any blood thinners has a past medical history of hypertension.  He has been trying to take fiber gummies and drink plenty of water.    Lab work unremarkable for any acute findings.  On exam there was no obvious external hemorrhoid.  No blood on the glove so no gross bleeding noted.  Discussed stool softeners  Sitz baths trying not to strain.  Follow up with primary care if persists for the next couple weeks.    Amount and/or Complexity of Data Reviewed  Labs:  Decision-making details documented in ED Course.    Risk  Prescription drug management.      Additional MDM:   Differential Diagnosis:   Other: The following diagnoses were also considered and will be evaluated: Anal fissure, External hemorrhoid and Internal hemorrhoid.                                   Clinical Impression:  Final diagnoses:  [K92.1] Blood in stool (Primary)          ED Disposition Condition    Discharge Stable          ED Prescriptions       Medication Sig Dispense Start Date End Date Auth. Provider    lactulose (CHRONULAC) 20 gram/30 mL Soln Take 30 mLs (20 g total) by mouth 2 (two) times daily. for 10 days 600 mL 2/3/2025 2/13/2025 Christine Londono FNP          Follow-up Information       Follow up With Specialties Details Why Contact Info    primary care provider  Call in 1 week As needed              Christine Londono FNP  02/03/25 2021